# Patient Record
Sex: FEMALE | URBAN - METROPOLITAN AREA
[De-identification: names, ages, dates, MRNs, and addresses within clinical notes are randomized per-mention and may not be internally consistent; named-entity substitution may affect disease eponyms.]

---

## 2017-04-26 ENCOUNTER — APPOINTMENT (OUTPATIENT)
Age: 82
Setting detail: DERMATOLOGY
End: 2017-04-27

## 2017-04-26 DIAGNOSIS — K13.0 DISEASES OF LIPS: ICD-10-CM

## 2017-04-26 DIAGNOSIS — L81.4 OTHER MELANIN HYPERPIGMENTATION: ICD-10-CM

## 2017-04-26 PROCEDURE — OTHER COUNSELING: OTHER

## 2017-04-26 PROCEDURE — 99203 OFFICE O/P NEW LOW 30 MIN: CPT

## 2017-04-26 PROCEDURE — OTHER MIPS QUALITY: OTHER

## 2017-04-26 ASSESSMENT — LOCATION SIMPLE DESCRIPTION DERM
LOCATION SIMPLE: RIGHT HAND
LOCATION SIMPLE: LEFT LIP
LOCATION SIMPLE: RIGHT LIP
LOCATION SIMPLE: LEFT HAND

## 2017-04-26 ASSESSMENT — LOCATION DETAILED DESCRIPTION DERM
LOCATION DETAILED: LEFT ORAL COMMISSURE
LOCATION DETAILED: LEFT ULNAR DORSAL HAND
LOCATION DETAILED: RIGHT SUPERIOR VERMILION LIP
LOCATION DETAILED: RIGHT RADIAL DORSAL HAND

## 2017-04-26 ASSESSMENT — LOCATION ZONE DERM
LOCATION ZONE: HAND
LOCATION ZONE: LIP

## 2021-10-11 NOTE — PROCEDURE: MIPS QUALITY
"Per. Dr. Arana:  \"It appears patient has not been seen in clinic for a couple of years. She does have a past history of hypertension and should be on medication.  The current medication list does not appear updated.  I therefore cannot make any recommendations on medication changes at this time.  I would recommend the patient go to the ER tonight, call 911, given the severity of her blood pressure readings.\"    Relayed the following information to Vanda.  She is hesitant to call 911 as she has been having high blood pressures for some time.    Educated Vanda on the risks of high blood pressure and advised for her to be seen in ER. She is insistent on making an appointment tomorrow in clinic.  Transferred to scheduling.     Cheyenne Hanks RN   10/11/21 4:44 PM  [facility] Nurse Advisor  "
"Triage call:     Nurse practitioner, Arlene calling with a visit for [employer].   She reports the following BP readings from her visit:  217/128 while sitting   190/112  191/119  Vanda is also on the call.  She states she has occasional headaches but her BP is \"in the 180s normally.\"   Denies SOB, chest pain, weakness/numbness.   She is not taking any medication for blood pressure as she states it has \"made it go up in the past.\"   Vanda does not have access to a ride to the hospital or clinic.     Arlene is also concerned and reports a PHQ9 score of 10-moderate depression.    According to the protocol, patient should be seen today in office.  Vanda states she does not have access to a ride to the clinic.  Will route to care team for second level triage.  Vanda can be reached at [phone]    Cheyenne Hanks RN   10/11/21 3:51 PM  [facility] Nurse Advisor    Reason for Disposition    Systolic BP >= 180 OR Diastolic >= 110    Additional Information    Negative: Sounds like a life-threatening emergency to the triager    Negative: Pregnant > 20 weeks or postpartum (< 6 weeks after delivery) and new hand or face swelling    Negative: Pregnant > 20 weeks and BP > 140/90    Negative: Systolic BP >= 160 OR Diastolic >= 100, and any cardiac or neurologic symptoms (e.g., chest pain, difficulty breathing, unsteady gait, blurred vision)    Negative: Patient sounds very sick or weak to the triager    Negative: BP Systolic BP >= 140 OR Diastolic >= 90 and postpartum (from 0 to 6 weeks after delivery)    Negative: Systolic BP >= 180 OR Diastolic >= 110, and missed most recent dose of blood pressure medication    Protocols used: HIGH BLOOD PRESSURE-A-OH                      "
It appears patient has not been seen in clinic for a couple of years.  She does have a past history of hypertension and should be on medication.  The current medication list does not appear updated.  I therefore cannot make any recommendations on medication changes at this time.  I would recommend the patient go to the ER tonight, call 911, given the severity of her blood pressure readings.  
Detail Level: Detailed
Quality 154 Part B: Falls: Risk Screening (Should Be Reported With Measure 155.): Patient screened for future fall risk; documentation of no falls in the past year or only one fall without injury in the past year
Quality 431: Preventive Care And Screening: Unhealthy Alcohol Use - Screening: Patient screened for unhealthy alcohol use using a single question and scores less than 2 times per year
Quality 226: Preventive Care And Screening: Tobacco Use: Screening And Cessation Intervention: Patient screened for tobacco and is an ex-smoker
Quality 110: Preventive Care And Screening: Influenza Immunization: Influenza Immunization previously received during influenza season
Quality 111:Pneumonia Vaccination Status For Older Adults: Pneumococcal Vaccination Previously Received
Quality 154 Part A: Falls: Risk Assessment (Should Be Reported With Measure 155.): Falls risk assessment completed and documented in the past 12 months.